# Patient Record
Sex: FEMALE | Race: WHITE | NOT HISPANIC OR LATINO | URBAN - NONMETROPOLITAN AREA
[De-identification: names, ages, dates, MRNs, and addresses within clinical notes are randomized per-mention and may not be internally consistent; named-entity substitution may affect disease eponyms.]

---

## 2024-02-08 ENCOUNTER — OFFICE (OUTPATIENT)
Dept: URBAN - NONMETROPOLITAN AREA CLINIC 1 | Facility: CLINIC | Age: 29
End: 2024-02-08

## 2024-02-08 VITALS
HEIGHT: 60 IN | WEIGHT: 220 LBS | HEART RATE: 76 BPM | DIASTOLIC BLOOD PRESSURE: 88 MMHG | SYSTOLIC BLOOD PRESSURE: 125 MMHG

## 2024-02-08 DIAGNOSIS — K58.2 MIXED IRRITABLE BOWEL SYNDROME: ICD-10-CM

## 2024-02-08 DIAGNOSIS — K92.1 MELENA: ICD-10-CM

## 2024-02-08 DIAGNOSIS — K21.9 GASTRO-ESOPHAGEAL REFLUX DISEASE WITHOUT ESOPHAGITIS: ICD-10-CM

## 2024-02-08 DIAGNOSIS — R10.84 GENERALIZED ABDOMINAL PAIN: ICD-10-CM

## 2024-02-08 PROCEDURE — 99214 OFFICE O/P EST MOD 30 MIN: CPT | Performed by: NURSE PRACTITIONER

## 2024-02-08 NOTE — SERVICENOTES
Risks of procedure explained to patient and she wishes to proceed 
Will request food allergy panel from PCP and review
Will schedule EGD for s/s as above and r/o PUD, h.pylori, celiac, and EOE given food/environment allergies
Will start Panto 20mg po once daily for gerd-ok to continue famotidine as prescribed.
Alternating bowels, likely IBS in nature, consider Bentyl if s/s persist.
Scant episodes of hematochezia yearly, could be hemorrhoid related, will consider colonoscopy vs flexsig in future.
Avoid nsaids. F/u in clinic after scopes.

## 2024-02-08 NOTE — SERVICEHPINOTES
Patient with a history of diverticulosis, endometriosis, GERD, and allergies presents to clinic today for stomach issues.  She reports she has intermittent hives, itchy rashes all over, and noticed these more after consuming gluten.  She had EGD as a child and reported GERD and peanut allergy. She has been to ENT and asthma and allergy and has even underwent series of allergy injections for hives and allergies, without relief.  She stopped these injections due to recent pregnancy.   She has chronic GERD and takes famotidine 20mg qhs with good relief.  She reports joint pain all over intermittently.  Her bowel habits fluctuate from days without a stool, to days with constipation, followed by loose stools.  She reports bright red bloody stool with defecation at times as well, but this is only approximately 2-4 times per year.  Denies any bright red stools at current.  She complains of abdominal cramping with no correlation to meal intake that is not relieved by defecation.  Reports abdominal bloating but denies n/v, hematemesis, unintentional weight loss or fever.  Denies typical heartburn s/s.  She reports extreme fatigue more than usual.  She had a colonoscopy in her early 20's and found diverticulosis (no report).  She admits taking Ibuprofen prn aches/pains.  She reports her son has celiac disease and she is concerned she might have this as well.  Reports a family history of Crohn's in her paternal aunt.  [facility] revealed negative for celiac ttg, tta, thyroid panel normal, cbc and cmp normal, Immunoglobulin A elevated 380.  Food allergy panel was obtained and is still pending.

## 2024-02-09 RX ORDER — PANTOPRAZOLE SODIUM 20 MG/1
20 TABLET, DELAYED RELEASE ORAL
Qty: 30 | Refills: 11 | Status: ACTIVE
Start: 2024-02-09

## 2024-04-04 ENCOUNTER — AMBULATORY SURGICAL CENTER (OUTPATIENT)
Dept: URBAN - NONMETROPOLITAN AREA SURGERY 1 | Facility: SURGERY | Age: 29
End: 2024-04-04
Payer: COMMERCIAL

## 2024-04-04 VITALS
SYSTOLIC BLOOD PRESSURE: 123 MMHG | HEART RATE: 67 BPM | SYSTOLIC BLOOD PRESSURE: 93 MMHG | TEMPERATURE: 98.4 F | RESPIRATION RATE: 17 BRPM | SYSTOLIC BLOOD PRESSURE: 93 MMHG | SYSTOLIC BLOOD PRESSURE: 103 MMHG | HEART RATE: 64 BPM | SYSTOLIC BLOOD PRESSURE: 83 MMHG | OXYGEN SATURATION: 100 % | HEART RATE: 78 BPM | DIASTOLIC BLOOD PRESSURE: 57 MMHG | RESPIRATION RATE: 17 BRPM | HEIGHT: 60 IN | SYSTOLIC BLOOD PRESSURE: 85 MMHG | HEART RATE: 67 BPM | RESPIRATION RATE: 14 BRPM | OXYGEN SATURATION: 98 % | DIASTOLIC BLOOD PRESSURE: 68 MMHG | HEART RATE: 64 BPM | RESPIRATION RATE: 14 BRPM | SYSTOLIC BLOOD PRESSURE: 103 MMHG | WEIGHT: 215 LBS | DIASTOLIC BLOOD PRESSURE: 68 MMHG | DIASTOLIC BLOOD PRESSURE: 44 MMHG | DIASTOLIC BLOOD PRESSURE: 57 MMHG | HEART RATE: 68 BPM | DIASTOLIC BLOOD PRESSURE: 79 MMHG | DIASTOLIC BLOOD PRESSURE: 43 MMHG | WEIGHT: 215 LBS | SYSTOLIC BLOOD PRESSURE: 85 MMHG | RESPIRATION RATE: 16 BRPM | DIASTOLIC BLOOD PRESSURE: 79 MMHG | TEMPERATURE: 98.4 F | DIASTOLIC BLOOD PRESSURE: 43 MMHG | DIASTOLIC BLOOD PRESSURE: 44 MMHG | HEART RATE: 68 BPM | RESPIRATION RATE: 16 BRPM | HEART RATE: 70 BPM | OXYGEN SATURATION: 98 % | HEART RATE: 78 BPM | OXYGEN SATURATION: 100 % | SYSTOLIC BLOOD PRESSURE: 123 MMHG | DIASTOLIC BLOOD PRESSURE: 42 MMHG | DIASTOLIC BLOOD PRESSURE: 42 MMHG | SYSTOLIC BLOOD PRESSURE: 83 MMHG | HEIGHT: 60 IN | HEART RATE: 70 BPM

## 2024-04-04 DIAGNOSIS — K29.50 UNSPECIFIED CHRONIC GASTRITIS WITHOUT BLEEDING: ICD-10-CM

## 2024-04-04 DIAGNOSIS — K21.9 GASTRO-ESOPHAGEAL REFLUX DISEASE WITHOUT ESOPHAGITIS: ICD-10-CM

## 2024-04-04 PROBLEM — Z83.79 FAMILY HISTORY OF CELIAC DISEASE: Status: ACTIVE | Noted: 2024-04-04

## 2024-04-04 PROCEDURE — 43239 EGD BIOPSY SINGLE/MULTIPLE: CPT | Performed by: INTERNAL MEDICINE

## 2024-04-04 RX ADMIN — PROPOFOL 300 MG: 10 INJECTION, EMULSION INTRAVENOUS at 10:23

## 2024-07-15 ENCOUNTER — OFFICE (OUTPATIENT)
Dept: URBAN - NONMETROPOLITAN AREA CLINIC 1 | Facility: CLINIC | Age: 29
End: 2024-07-15
Payer: COMMERCIAL

## 2024-07-15 VITALS
WEIGHT: 210 LBS | DIASTOLIC BLOOD PRESSURE: 85 MMHG | HEART RATE: 73 BPM | SYSTOLIC BLOOD PRESSURE: 118 MMHG | HEIGHT: 60 IN

## 2024-07-15 DIAGNOSIS — R10.30 LOWER ABDOMINAL PAIN, UNSPECIFIED: ICD-10-CM

## 2024-07-15 DIAGNOSIS — K92.1 MELENA: ICD-10-CM

## 2024-07-15 DIAGNOSIS — R19.4 CHANGE IN BOWEL HABIT: ICD-10-CM

## 2024-07-15 PROCEDURE — 99214 OFFICE O/P EST MOD 30 MIN: CPT | Performed by: NURSE PRACTITIONER

## 2024-07-15 PROCEDURE — 36415 COLL VENOUS BLD VENIPUNCTURE: CPT | Performed by: NURSE PRACTITIONER

## 2024-07-15 RX ORDER — DICYCLOMINE HYDROCHLORIDE 10 MG/1
CAPSULE ORAL
Qty: 90 | Refills: 3 | Status: ACTIVE
Start: 2024-07-15

## 2024-07-15 NOTE — SERVICEHPINOTES
Patient recently underwent partial hysterectomy.  She reports abdominal cramping throughout the day and will have 3-4 soft BM daily but the abdominal pain does not subside with defecation.  She denies diarrhea, vomiting, unintentional weight loss, bloody stools, or melena.  She has had scant intermittent hematochezia at times but no recent episodes.  Denies recent antibiotic use, illness, or fever.  The abdominal cramping/pain is RLQ and lower abd mostly.  Denies rectal pain.  She c/o GERD at initial OV in which she underwent EGD 4/24 that showed erosion of antrum-bx negative for celiac, h.pylori.  She was started on low dose ppi, but has stopped ppi therapy as her GERD has resolved.   She only takes Magnesium bid, and stopped Ibuprofen as well.  Denies cardiac stents, anticoagulation therapy, ckd, or supplemental o2 use. br
brInitial OV 2/2024- LR, FNP-Cbr Patient with a history of diverticulosis, endometriosis, GERD, and allergies presents to clinic today for stomach issues. She reports she has intermittent hives, itchy rashes all over, and noticed these more after consuming gluten. She had EGD as a child and reported GERD and peanut allergy. She has been to ENT and asthma and allergy and has even underwent series of allergy injections for hives and allergies, without relief. She stopped these injections due to recent pregnancy. She has chronic GERD and takes famotidine 20mg qhs with good relief. She reports joint pain all over intermittently. Her bowel habits fluctuate from days without a stool, to days with constipation, followed by loose stools. She reports bright red bloody stool with defecation at times as well, but this is only approximately 2-4 times per year. Denies any bright red stools at current. She complains of abdominal cramping with no correlation to meal intake that is not relieved by defecation. Reports abdominal bloating but denies n/v, hematemesis, unintentional weight loss or fever. Denies typical heartburn s/s. She reports extreme fatigue more than usual. She had a colonoscopy in her early 20's and found diverticulosis (no report). She admits taking Ibuprofen prn aches/pains. She reports her son has celiac disease and she is concerned she might have this as well. Reports a family history of Crohn's in her paternal aunt. [facility] revealed negative for celiac ttg, tta, thyroid panel normal, cbc and cmp normal, Immunoglobulin A elevated 380. Food allergy panel was obtained and is still pending. 
br
br   EGD by Dr. Pandya 4/2024
br	Normal esophagus.br	Mild petechial type inflammatory changes in the antrum without evidence of erosion ulcer mass or varices. Biopsies were taken from the antrum incisura and gastric body primarily to rule out H pylori. (Biopsy).br	Normal appearance of the duodenal mucosa. Biopsies were taken to evaluate for celiac disease. (Biopsy). 
br negative for celiac and H.pylori

## 2024-07-15 NOTE — SERVICENOTES
Risks of procedure explained to patient and she wishes to proceed
Bowel prep prescribed and instructions given to patient
Labs above and Stool studies to rule out infectious/underlying etiology of diarrhea in setting of daily loose stools
Colonoscopy for change in bowel habits, loose stools daily, abdominal pain, hematochezia family history of Crohn's-celiac panel and EGD neg, food allergy panel neg. 
Will trial dicyclomine as above for abdominal cramping
Increase fiber intake daily
Avoid all NSAIDs.